# Patient Record
Sex: FEMALE | Race: BLACK OR AFRICAN AMERICAN | Employment: UNEMPLOYED | URBAN - METROPOLITAN AREA
[De-identification: names, ages, dates, MRNs, and addresses within clinical notes are randomized per-mention and may not be internally consistent; named-entity substitution may affect disease eponyms.]

---

## 2017-02-22 ENCOUNTER — OFFICE VISIT (OUTPATIENT)
Dept: FAMILY MEDICINE CLINIC | Facility: CLINIC | Age: 37
End: 2017-02-22

## 2017-02-22 VITALS
TEMPERATURE: 98 F | WEIGHT: 168.63 LBS | DIASTOLIC BLOOD PRESSURE: 68 MMHG | RESPIRATION RATE: 16 BRPM | HEART RATE: 78 BPM | SYSTOLIC BLOOD PRESSURE: 108 MMHG | BODY MASS INDEX: 27 KG/M2

## 2017-02-22 DIAGNOSIS — Z02.89 ENCOUNTER FOR PHYSICAL EXAMINATION RELATED TO EMPLOYMENT: Primary | ICD-10-CM

## 2017-02-22 PROCEDURE — 99385 PREV VISIT NEW AGE 18-39: CPT | Performed by: NURSE PRACTITIONER

## 2017-02-22 NOTE — PROGRESS NOTES
CHIEF COMPLAINT:     Patient presents with:  Employment Physical      HPI:   Catina Bustillo is a 29year old female who presents for physical exam for her yearly employment exam for a  facility. Patient has concerns of none.         No curr suspicious lesions  HEENT: atraumatic, normocephalic,ears and throat are clear  EYES:PERRLA, EOMI, normal optic disk,conjunctiva are clear  NECK: supple,no adenopathy,no bruits  CHEST: no chest tenderness  LUNGS: Clear to auscultation bilaterally.  No dimin

## 2018-06-08 ENCOUNTER — TELEPHONE (OUTPATIENT)
Dept: OBGYN CLINIC | Facility: CLINIC | Age: 38
End: 2018-06-08

## 2018-06-08 NOTE — TELEPHONE ENCOUNTER
Please call patient for an new OB appt. She did have her last child in 2015, so I did not know if you need to question her or if I can schedule.  She stated she wanted to come in for a pregnancy test.

## 2018-06-11 ENCOUNTER — TELEPHONE (OUTPATIENT)
Dept: OBGYN CLINIC | Facility: CLINIC | Age: 38
End: 2018-06-11

## 2018-06-11 NOTE — TELEPHONE ENCOUNTER
Patient called to schedule NOB. This is her 7th pregnancy. No h/o miscarriage. Denies any cramping or vaginal bleeding. She does have N/V but is able to keep fluids and food down. Precautions given regarding that. NOB scheduled.  Advised patient to c

## 2018-07-05 ENCOUNTER — OFFICE VISIT (OUTPATIENT)
Dept: OBGYN CLINIC | Facility: CLINIC | Age: 38
End: 2018-07-05

## 2018-07-05 ENCOUNTER — TELEPHONE (OUTPATIENT)
Dept: OBGYN CLINIC | Facility: CLINIC | Age: 38
End: 2018-07-05

## 2018-07-05 ENCOUNTER — LAB ENCOUNTER (OUTPATIENT)
Dept: LAB | Age: 38
End: 2018-07-05
Attending: OBSTETRICS & GYNECOLOGY
Payer: COMMERCIAL

## 2018-07-05 VITALS
HEIGHT: 66 IN | DIASTOLIC BLOOD PRESSURE: 70 MMHG | SYSTOLIC BLOOD PRESSURE: 98 MMHG | BODY MASS INDEX: 26.2 KG/M2 | WEIGHT: 163 LBS | HEART RATE: 87 BPM

## 2018-07-05 DIAGNOSIS — O20.9 VAGINAL BLEEDING IN PREGNANCY, FIRST TRIMESTER: Primary | ICD-10-CM

## 2018-07-05 DIAGNOSIS — O20.0 THREATENED MISCARRIAGE: ICD-10-CM

## 2018-07-05 DIAGNOSIS — O20.9 BLEEDING IN EARLY PREGNANCY: ICD-10-CM

## 2018-07-05 DIAGNOSIS — O20.9 BLEEDING IN EARLY PREGNANCY: Primary | ICD-10-CM

## 2018-07-05 LAB
ANTIBODY SCREEN: NEGATIVE
BASOPHILS # BLD AUTO: 0.04 X10(3) UL (ref 0–0.1)
BASOPHILS NFR BLD AUTO: 0.4 %
EOSINOPHIL # BLD AUTO: 0.24 X10(3) UL (ref 0–0.3)
EOSINOPHIL NFR BLD AUTO: 2.2 %
ERYTHROCYTE [DISTWIDTH] IN BLOOD BY AUTOMATED COUNT: 13.7 % (ref 11.5–16)
HCG QUANTITATIVE: 4615 MIU/ML (ref ?–3)
HCT VFR BLD AUTO: 36.2 % (ref 34–50)
HGB BLD-MCNC: 11.6 G/DL (ref 12–16)
IMMATURE GRANULOCYTE COUNT: 0.04 X10(3) UL (ref 0–1)
IMMATURE GRANULOCYTE RATIO %: 0.4 %
LYMPHOCYTES # BLD AUTO: 2.42 X10(3) UL (ref 0.9–4)
LYMPHOCYTES NFR BLD AUTO: 22 %
MCH RBC QN AUTO: 28.2 PG (ref 27–33.2)
MCHC RBC AUTO-ENTMCNC: 32 G/DL (ref 31–37)
MCV RBC AUTO: 87.9 FL (ref 81–100)
MONOCYTES # BLD AUTO: 0.75 X10(3) UL (ref 0.1–1)
MONOCYTES NFR BLD AUTO: 6.8 %
NEUTROPHIL ABS PRELIM: 7.49 X10 (3) UL (ref 1.3–6.7)
NEUTROPHILS # BLD AUTO: 7.49 X10(3) UL (ref 1.3–6.7)
NEUTROPHILS NFR BLD AUTO: 68.2 %
PLATELET # BLD AUTO: 270 10(3)UL (ref 150–450)
RBC # BLD AUTO: 4.12 X10(6)UL (ref 3.8–5.1)
RED CELL DISTRIBUTION WIDTH-SD: 44.1 FL (ref 35.1–46.3)
RH BLOOD TYPE: POSITIVE
WBC # BLD AUTO: 11 X10(3) UL (ref 4–13)

## 2018-07-05 PROCEDURE — 99213 OFFICE O/P EST LOW 20 MIN: CPT | Performed by: OBSTETRICS & GYNECOLOGY

## 2018-07-05 PROCEDURE — 36415 COLL VENOUS BLD VENIPUNCTURE: CPT | Performed by: OBSTETRICS & GYNECOLOGY

## 2018-07-05 PROCEDURE — 85025 COMPLETE CBC W/AUTO DIFF WBC: CPT | Performed by: OBSTETRICS & GYNECOLOGY

## 2018-07-05 PROCEDURE — 84702 CHORIONIC GONADOTROPIN TEST: CPT | Performed by: OBSTETRICS & GYNECOLOGY

## 2018-07-05 PROCEDURE — 86901 BLOOD TYPING SEROLOGIC RH(D): CPT | Performed by: OBSTETRICS & GYNECOLOGY

## 2018-07-05 PROCEDURE — 86900 BLOOD TYPING SEROLOGIC ABO: CPT | Performed by: OBSTETRICS & GYNECOLOGY

## 2018-07-05 PROCEDURE — 86850 RBC ANTIBODY SCREEN: CPT | Performed by: OBSTETRICS & GYNECOLOGY

## 2018-07-05 RX ORDER — SWAB
SWAB, NON-MEDICATED MISCELLANEOUS
COMMUNITY
End: 2018-07-19

## 2018-07-05 NOTE — TELEPHONE ENCOUNTER
LMP 18; ; had NOB scheduled for 7/3 but missed appt; rescheduled for . Pt called service yesterday for spotting; spoke with Dr. Michael Basurto. Pt was advised by Dr. Michael Basurto to be seen today and have [address].   Pt states bleeding has increased now; like a

## 2018-07-05 NOTE — TELEPHONE ENCOUNTER
Notified pt that she should complete labs as soon as possible and be seen in office today. 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 Pt states she can go to the lab but cannot make an appt today.   Arash g

## 2018-07-05 NOTE — TELEPHONE ENCOUNTER
Pt seen in office today. Requires stat u/s for vaginal bleeding in pregnancy. Call to central scheduling. Per representative, [facility] does not participate with her insurance. Can book appt as self pay or she can be seen at another facility.

## 2018-07-05 NOTE — TELEPHONE ENCOUNTER
Two calls, one in morning, for two episodes of staining. Claims to be 10 weeks EGA. Had appt on July 3rd, but did not make appt. To call office in morning to be seen.

## 2018-07-05 NOTE — TELEPHONE ENCOUNTER
Please have patient seen in [place] with US by Kings Liu. She can also get stat beta HCH, CBC and type and screen in [place].

## 2018-07-05 NOTE — TELEPHONE ENCOUNTER
If Kimberly Penny is not available then patient to be seen in [place]. Also to complete stat labs as soon as possible.

## 2018-07-05 NOTE — PROGRESS NOTES
GYN H&P     7/5/2018  4:26 PM    CC: Patient presents with: Other: Pt been bleeding since yesterday and having abd creamping. HPI: patient is a 41year old [id number] here for Patient presents with:   Other: Pt been bleeding since yesterday and havin History   Problem Relation Age of Onset   • Heart Disorder Maternal Grandmother    • Hypertension Maternal Grandmother        Social History  Social History   Marital status:   Spouse name: N/A    Years of education: N/A  Number of children: N/A Chica Richardson MD

## 2018-07-05 NOTE — TELEPHONE ENCOUNTER
Left message on answering machine to call back. Per Dr. Erma Leslie: Please call pt to be seen today. Will need ultrasound room. Okay to send to [place] to have Jennifer Servin do ultrasound prior to being seen, if Jennifer Servin doing ultrasounds today.  (Routing comment)

## 2018-07-06 ENCOUNTER — TELEPHONE (OUTPATIENT)
Dept: OBGYN CLINIC | Facility: CLINIC | Age: 38
End: 2018-07-06

## 2018-07-06 NOTE — TELEPHONE ENCOUNTER
Left message on answering machine to call back. Obtained appt for patient with [facility]; [address] (next to [facility]).   Appt Monday 7/9/18 at 4:

## 2018-07-06 NOTE — TELEPHONE ENCOUNTER
Call back from Rossy at [facility]. They are unable to see patient for originally scheduled appt; not in network. Per Rossy, [facility] is also not in network with patient's plan.   Awaiting call back from patient to dis

## 2018-07-06 NOTE — TELEPHONE ENCOUNTER
Received phone call from Radiologist at [facility]. Patient's OB US noted for non-viable IUP. No fetal cardiac activity and irregular mass that measures 8wks+ but not strongly c/w with fetal pole. No second gestational sac noted.  Also POC noted in lower u

## 2018-07-09 NOTE — TELEPHONE ENCOUNTER
Discussed with patient that we were unable to find f/u care for her within her insurance network. Advised pt to call insurance to find a list of OB/Gyn in network. Advised pt to try to be seen for f/u this week.   Pt still having moderate bleeding like a

## 2018-07-09 NOTE — TELEPHONE ENCOUNTER
Pt calling and on US Result  She miscarrend last week    We do NOT take her INS    Please advise Pt what to do next

## 2018-07-10 ENCOUNTER — HOSPITAL ENCOUNTER (EMERGENCY)
Age: 38
Discharge: HOME | End: 2018-07-10
Attending: EMERGENCY MEDICINE
Payer: COMMERCIAL

## 2018-07-10 ENCOUNTER — APPOINTMENT (OUTPATIENT)
Dept: ULTRASOUND IMAGING | Age: 38
End: 2018-07-10
Attending: EMERGENCY MEDICINE
Payer: COMMERCIAL

## 2018-07-10 VITALS
DIASTOLIC BLOOD PRESSURE: 57 MMHG | OXYGEN SATURATION: 98 % | HEART RATE: 88 BPM | HEIGHT: 66 IN | WEIGHT: 163 LBS | BODY MASS INDEX: 26.2 KG/M2 | SYSTOLIC BLOOD PRESSURE: 104 MMHG | RESPIRATION RATE: 16 BRPM | TEMPERATURE: 99 F

## 2018-07-10 DIAGNOSIS — O03.9 MISCARRIAGE: Primary | ICD-10-CM

## 2018-07-10 LAB
BASOPHILS # BLD AUTO: 0.04 X10(3) UL (ref 0–0.1)
BASOPHILS NFR BLD AUTO: 0.4 %
BUN BLD-MCNC: 8 MG/DL (ref 8–20)
CALCIUM BLD-MCNC: 9 MG/DL (ref 8.3–10.3)
CHLORIDE: 106 MMOL/L (ref 101–111)
CO2: 26 MMOL/L (ref 22–32)
CREAT BLD-MCNC: 0.68 MG/DL (ref 0.55–1.02)
EOSINOPHIL # BLD AUTO: 0.14 X10(3) UL (ref 0–0.3)
EOSINOPHIL NFR BLD AUTO: 1.5 %
ERYTHROCYTE [DISTWIDTH] IN BLOOD BY AUTOMATED COUNT: 14 % (ref 11.5–16)
GLUCOSE BLD-MCNC: 94 MG/DL (ref 70–99)
HCT VFR BLD AUTO: 32.8 % (ref 34–50)
HGB BLD-MCNC: 10.8 G/DL (ref 12–16)
IMMATURE GRANULOCYTE COUNT: 0.02 X10(3) UL (ref 0–1)
IMMATURE GRANULOCYTE RATIO %: 0.2 %
LYMPHOCYTES # BLD AUTO: 2.68 X10(3) UL (ref 0.9–4)
LYMPHOCYTES NFR BLD AUTO: 28.5 %
MCH RBC QN AUTO: 29 PG (ref 27–33.2)
MCHC RBC AUTO-ENTMCNC: 32.9 G/DL (ref 31–37)
MCV RBC AUTO: 87.9 FL (ref 81–100)
MONOCYTES # BLD AUTO: 0.75 X10(3) UL (ref 0.1–1)
MONOCYTES NFR BLD AUTO: 8 %
NEUTROPHIL ABS PRELIM: 5.79 X10 (3) UL (ref 1.3–6.7)
NEUTROPHILS # BLD AUTO: 5.79 X10(3) UL (ref 1.3–6.7)
NEUTROPHILS NFR BLD AUTO: 61.4 %
PLATELET # BLD AUTO: 266 10(3)UL (ref 150–450)
POTASSIUM SERPL-SCNC: 3.8 MMOL/L (ref 3.6–5.1)
RBC # BLD AUTO: 3.73 X10(6)UL (ref 3.8–5.1)
RED CELL DISTRIBUTION WIDTH-SD: 45.1 FL (ref 35.1–46.3)
SODIUM SERPL-SCNC: 138 MMOL/L (ref 136–144)
WBC # BLD AUTO: 9.4 X10(3) UL (ref 4–13)

## 2018-07-10 PROCEDURE — 80048 BASIC METABOLIC PNL TOTAL CA: CPT | Performed by: EMERGENCY MEDICINE

## 2018-07-10 PROCEDURE — 96374 THER/PROPH/DIAG INJ IV PUSH: CPT

## 2018-07-10 PROCEDURE — 99284 EMERGENCY DEPT VISIT MOD MDM: CPT

## 2018-07-10 PROCEDURE — 96361 HYDRATE IV INFUSION ADD-ON: CPT

## 2018-07-10 PROCEDURE — 85025 COMPLETE CBC W/AUTO DIFF WBC: CPT | Performed by: EMERGENCY MEDICINE

## 2018-07-10 RX ORDER — SODIUM CHLORIDE 9 MG/ML
125 INJECTION, SOLUTION INTRAVENOUS CONTINUOUS
Status: DISCONTINUED | OUTPATIENT
Start: 2018-07-10 | End: 2018-07-10

## 2018-07-10 RX ORDER — IBUPROFEN 600 MG/1
600 TABLET ORAL EVERY 8 HOURS PRN
Qty: 30 TABLET | Refills: 0 | Status: SHIPPED | OUTPATIENT
Start: 2018-07-10 | End: 2018-07-17

## 2018-07-10 RX ORDER — KETOROLAC TROMETHAMINE 30 MG/ML
15 INJECTION, SOLUTION INTRAMUSCULAR; INTRAVENOUS ONCE
Status: COMPLETED | OUTPATIENT
Start: 2018-07-10 | End: 2018-07-10

## 2018-07-10 NOTE — CM/SW NOTE
Asked to follow up with patient to ensure in-network OB-Gyne follow up. Patient has [employer]. Had been seeing Dr. Aleyda Santos. Notes reviewed.  Because this MD is out of network with patient's insurance and out of network benefits have a much higher

## 2018-07-10 NOTE — TELEPHONE ENCOUNTER
Called patient to apologize for the confusion. Talked to patient about her plan and what she knows about her plan. She states she hasn't had the time to look into her plan because of the miscarriage she is experiencing.   She states she has out-of network

## 2018-07-10 NOTE — ED PROVIDER NOTES
Patient Seen in: THE [facility] Emergency Department In [place]    History   Patient presents with:  Eval-G (gynecologic)    Stated Complaint: abd cramping and vaginal bleeding    HPI    52-year-old female, had a spontaneous AB over the last several days.   Sh Nose normal.   Eyes: EOM are normal. Pupils are equal, round, and reactive to light. Neck: Normal range of motion. Neck supple. No JVD present. Cardiovascular: Normal rate and regular rhythm.     Pulmonary/Chest: Effort normal and breath sounds normal. symptoms instead. I feel that this is reasonable at this juncture. I reviewed her chart, it seems that she has had trouble following/establishing care with an obstetrician given her insurance.   We have contacted care manager to arrange for close follow

## 2018-07-10 NOTE — ED INITIAL ASSESSMENT (HPI)
Had miscarrage thurs- bleeding had decreased then today bleeding increased and passed large clots with cramping

## 2018-07-14 ENCOUNTER — LAB ENCOUNTER (OUTPATIENT)
Dept: LAB | Age: 38
End: 2018-07-14
Attending: FAMILY MEDICINE
Payer: COMMERCIAL

## 2018-07-14 ENCOUNTER — TELEPHONE (OUTPATIENT)
Dept: OBGYN CLINIC | Facility: CLINIC | Age: 38
End: 2018-07-14

## 2018-07-14 DIAGNOSIS — O03.9 SPONTANEOUS MISCARRIAGE: Primary | ICD-10-CM

## 2018-07-14 DIAGNOSIS — O03.9 SPONTANEOUS MISCARRIAGE: ICD-10-CM

## 2018-07-14 LAB — HCG QUANTITATIVE: 946 MIU/ML (ref ?–3)

## 2018-07-14 PROCEDURE — 36415 COLL VENOUS BLD VENIPUNCTURE: CPT | Performed by: NURSE PRACTITIONER

## 2018-07-14 PROCEDURE — 84702 CHORIONIC GONADOTROPIN TEST: CPT | Performed by: NURSE PRACTITIONER

## 2018-07-14 NOTE — TELEPHONE ENCOUNTER
Called and told patient that [email or web address]. I informed patient that we would call her on Monday with results.

## 2018-07-14 NOTE — TELEPHONE ENCOUNTER
It looks like Adryan Scott had called the patient and discussed the plan of care with her. It was recommended that she was to follow up in the office in the next week (which was this past week.) She will also need to have her HCG levels followed to zero.  She ca

## 2018-07-14 NOTE — TELEPHONE ENCOUNTER
I called patient back and told her that Ezekiel Aguero will put in an order for HCG levels to [facility], and that after that we will call with results.   Patient is unable to find a provider that accepts her insurance that will see her, since they originally did

## 2018-07-14 NOTE — TELEPHONE ENCOUNTER
DOVE  Patient has miscarried. She needs to be seen but patient did not feel like Dr. Miguel Angel Booker gave her any advice as to when to be seen. She is upset and felt like Dr. Miguel Angel Booker was not helpful.   Patient would prefer to not see her for a follow up visit mamadou

## 2018-07-17 ENCOUNTER — TELEPHONE (OUTPATIENT)
Dept: OBGYN CLINIC | Facility: CLINIC | Age: 38
End: 2018-07-17

## 2018-07-17 NOTE — PROGRESS NOTES
Patient notified of Hcg level and recommendations. Pt states she is unable to find a provider in network. Pt desires f/u appt with our office; appt scheduled.

## 2018-07-19 ENCOUNTER — OFFICE VISIT (OUTPATIENT)
Dept: OBGYN CLINIC | Facility: CLINIC | Age: 38
End: 2018-07-19
Payer: COMMERCIAL

## 2018-07-19 ENCOUNTER — TELEPHONE (OUTPATIENT)
Dept: OBGYN UNIT | Facility: HOSPITAL | Age: 38
End: 2018-07-19

## 2018-07-19 VITALS
HEART RATE: 97 BPM | DIASTOLIC BLOOD PRESSURE: 62 MMHG | BODY MASS INDEX: 26 KG/M2 | SYSTOLIC BLOOD PRESSURE: 104 MMHG | WEIGHT: 161 LBS

## 2018-07-19 DIAGNOSIS — O03.9 SPONTANEOUS MISCARRIAGE: Primary | ICD-10-CM

## 2018-07-19 PROCEDURE — 99213 OFFICE O/P EST LOW 20 MIN: CPT | Performed by: OBSTETRICS & GYNECOLOGY

## 2018-07-19 NOTE — PROGRESS NOTES
Fred Weinberg is a 41year old female.     HPI:   Patient presents with:  Vaginal Bleeding: f/u spont ab/ bleeding and cramping with some clots      States bleeding is less, darker today  Some clotting  Again reviewed how we would follow levels down, prescriptions requested or ordered in this encounter    Imaging & Referrals:  None     7/19/2018  Rajni Gomez MD

## 2018-08-29 ENCOUNTER — TELEPHONE (OUTPATIENT)
Dept: OBGYN CLINIC | Facility: CLINIC | Age: 38
End: 2018-08-29

## 2018-08-29 NOTE — TELEPHONE ENCOUNTER
Patient called, she had miscarriage on July 5th of this year. She wants a copy of her ultra sound and test they did on her during when she was pregnant then.  Pt request to speak to a nurse

## 2018-08-29 NOTE — TELEPHONE ENCOUNTER
Patient was looking for images when she was seen on 07/05/18. Informed patient that this was a quick scan and we do not have images available. She can contact Tawana Barton if she wants images from formal [address] [facility]. She verbalized understanding.  No further ques

## 2021-08-02 PROBLEM — O03.9 MISCARRIAGE: Status: ACTIVE | Noted: 2018-07-05

## 2021-08-25 PROBLEM — Z80.0 FAMILY HISTORY OF COLON CANCER: Status: ACTIVE | Noted: 2021-08-25

## (undated) NOTE — ED AVS SNAPSHOT
Vaishali Marina   MRN: [id number]    Department:  [facility] Emergency Department in [place]   Date of Visit:  7/10/2018           Disclosure     Insurance plans vary and the physician(s) referred by the ER may not be covered by your plan.  Please c tell this physician (or your personal doctor if your instructions are to return to your personal doctor) about any new or lasting problems. The primary care or specialist physician will see patients referred from the [facility] Emergency Department.  Rick Shaw

## (undated) NOTE — MR AVS SNAPSHOT
[address]  [address]  [address] [id number]  [phone]               Thank you for choosing us for your health care visit with Davon Richardson NP.   We are glad to serve you and happy to provide you with this summary Tips for making healthy food choices  -   Enjoy your food, but eat less. Fully enjoy your food when eating. Don’t eat while distracted and slow down. Avoid over sized portions. Don’t eat while when you’re bored.      EAT THESE FOODS MORE OFTEN: EAT